# Patient Record
Sex: FEMALE | Race: WHITE
[De-identification: names, ages, dates, MRNs, and addresses within clinical notes are randomized per-mention and may not be internally consistent; named-entity substitution may affect disease eponyms.]

---

## 2020-04-30 ENCOUNTER — HOSPITAL ENCOUNTER (OUTPATIENT)
Dept: HOSPITAL 92 - BICRAD | Age: 80
Discharge: HOME | End: 2020-04-30
Attending: ORTHOPAEDIC SURGERY
Payer: MEDICARE

## 2020-04-30 DIAGNOSIS — Z98.1: ICD-10-CM

## 2020-04-30 DIAGNOSIS — M54.5: Primary | ICD-10-CM

## 2020-04-30 DIAGNOSIS — M43.8X6: ICD-10-CM

## 2020-04-30 PROCEDURE — 72100 X-RAY EXAM L-S SPINE 2/3 VWS: CPT

## 2020-04-30 NOTE — RAD
TWO VIEWS LUMBOSACRAL SPINE:

 

COMPARISON: 

2/13/2020.

 

HISTORY: 

Low back pain.  Lumbar fusion in March of 2020.

 

FINDINGS: 

Two views of the lumbosacral spine show the patient to be status post fusion of L5 and S1 with bilate
ral pedicle screws.  A disk spacer is seen in the intervening disk space.  There is stable wedge comp
ression deformity of the L1 vertebral body with approximately 60% height loss.  There is no evidence 
of subluxation.  There is stable sclerotic curvature of the spine.  There is a questionable extralate
ral mass at L3 which may represent a congenital anomaly. No perihardware lucency is seen surrounding 
the fracture. 

 

IMPRESSION: 

1.  Status post fusion of L5-S1 without evidence of complication.

 

2.  Stable wedge compression deformity of L1.

 

POS: EAA

## 2022-10-19 ENCOUNTER — HOSPITAL ENCOUNTER (EMERGENCY)
Dept: HOSPITAL 92 - CSHERS | Age: 82
Discharge: HOME | End: 2022-10-19
Payer: MEDICARE

## 2022-10-19 DIAGNOSIS — I10: ICD-10-CM

## 2022-10-19 DIAGNOSIS — M25.521: ICD-10-CM
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
## 2022-10-19 DIAGNOSIS — E78.00: ICD-10-CM

## 2022-10-19 DIAGNOSIS — M25.561: Primary | ICD-10-CM

## 2022-10-19 PROCEDURE — 70450 CT HEAD/BRAIN W/O DYE: CPT

## 2022-10-19 PROCEDURE — 90471 IMMUNIZATION ADMIN: CPT

## 2022-10-19 PROCEDURE — 90715 TDAP VACCINE 7 YRS/> IM: CPT

## 2022-10-19 PROCEDURE — 96374 THER/PROPH/DIAG INJ IV PUSH: CPT

## 2024-06-28 ENCOUNTER — HOSPITAL ENCOUNTER (INPATIENT)
Dept: HOSPITAL 92 - CSHERS | Age: 84
LOS: 3 days | Discharge: HOME | DRG: 641 | End: 2024-07-01
Attending: INTERNAL MEDICINE | Admitting: INTERNAL MEDICINE
Payer: MEDICARE

## 2024-06-28 VITALS — BODY MASS INDEX: 25.8 KG/M2

## 2024-06-28 DIAGNOSIS — F32.A: ICD-10-CM

## 2024-06-28 DIAGNOSIS — I10: ICD-10-CM

## 2024-06-28 DIAGNOSIS — E87.1: Primary | ICD-10-CM

## 2024-06-28 DIAGNOSIS — R53.81: ICD-10-CM

## 2024-06-28 DIAGNOSIS — Z90.710: ICD-10-CM

## 2024-06-28 DIAGNOSIS — E78.5: ICD-10-CM

## 2024-06-28 DIAGNOSIS — N19: ICD-10-CM

## 2024-06-28 DIAGNOSIS — Z79.899: ICD-10-CM

## 2024-06-28 DIAGNOSIS — K59.00: ICD-10-CM

## 2024-06-28 DIAGNOSIS — E03.9: ICD-10-CM

## 2024-06-28 DIAGNOSIS — E86.0: ICD-10-CM

## 2024-06-28 DIAGNOSIS — E87.5: ICD-10-CM

## 2024-06-28 DIAGNOSIS — Z90.49: ICD-10-CM

## 2024-06-28 DIAGNOSIS — Z98.890: ICD-10-CM

## 2024-06-28 LAB
ALBUMIN SERPL BCG-MCNC: 3.3 G/DL (ref 3.4–4.8)
ALP SERPL-CCNC: 51 U/L (ref 40–110)
ALT SERPL W P-5'-P-CCNC: 9 U/L (ref 8–55)
ANION GAP SERPL CALC-SCNC: 11 MMOL/L (ref 10–20)
AST SERPL-CCNC: 15 U/L (ref 5–34)
BACTERIA UR QL AUTO: (no result) HPF
BASOPHILS # BLD AUTO: 0.05 10X3/UL (ref 0–0.2)
BASOPHILS NFR BLD AUTO: 0.6 % (ref 0–2)
BILIRUB SERPL-MCNC: 0.3 MG/DL (ref 0.2–1.2)
BUN SERPL-MCNC: 22 MG/DL (ref 9.8–20.1)
CALCIUM SERPL-MCNC: 8.4 MG/DL (ref 7.8–10.44)
CAUTI INDICATIONS FOR CULTURE: (no result)
CHLORIDE SERPL-SCNC: 98 MMOL/L (ref 98–107)
CK SERPL-CCNC: 33 U/L (ref 29–168)
CO2 SERPL-SCNC: 23 MMOL/L (ref 23–31)
CREAT CL PREDICTED SERPL C-G-VRATE: 0 ML/MIN (ref 70–130)
EOSINOPHIL # BLD AUTO: 0.17 10X3/UL (ref 0–0.5)
EOSINOPHIL NFR BLD AUTO: 1.9 % (ref 0–6)
GLOBULIN SER CALC-MCNC: 2.7 G/DL (ref 2.4–3.5)
GLUCOSE SERPL-MCNC: 98 MG/DL (ref 83–110)
HCT VFR BLD CALC: 34.4 % (ref 34.9–44.5)
HGB BLD-MCNC: 11.8 G/DL (ref 12–15.5)
LYMPHOCYTES NFR BLD AUTO: 14.7 % (ref 18–47)
MCH RBC QN AUTO: 29.1 PG (ref 27–33)
MCV RBC AUTO: 84.7 FL (ref 81.6–98.3)
MONOCYTES # BLD AUTO: 1.06 10X3/UL (ref 0–1.1)
MONOCYTES NFR BLD AUTO: 11.7 % (ref 0–10)
NEUTROPHILS # BLD AUTO: 6.41 10X3/UL (ref 1.5–8.4)
NEUTROPHILS NFR BLD AUTO: 70.7 % (ref 40–75)
PLATELET # BLD AUTO: 260 10X3/UL (ref 150–450)
POTASSIUM SERPL-SCNC: 4.1 MMOL/L (ref 3.5–5.1)
RBC # BLD AUTO: 4.06 10X6/UL (ref 3.9–5.03)
RBC UR QL AUTO: (no result) HPF (ref 0–3)
SODIUM SERPL-SCNC: 128 MMOL/L (ref 136–145)
SP GR UR STRIP: 1.01 (ref 1–1.03)
TROPONIN I SERPL DL<=0.01 NG/ML-MCNC: (no result) NG/ML (ref ?–0.03)
WBC # BLD AUTO: 9.1 10X3/UL (ref 3.5–10.5)
WBC UR QL AUTO: (no result) HPF (ref 0–3)

## 2024-06-28 PROCEDURE — 84443 ASSAY THYROID STIM HORMONE: CPT

## 2024-06-28 PROCEDURE — 84300 ASSAY OF URINE SODIUM: CPT

## 2024-06-28 PROCEDURE — 87086 URINE CULTURE/COLONY COUNT: CPT

## 2024-06-28 PROCEDURE — 96374 THER/PROPH/DIAG INJ IV PUSH: CPT

## 2024-06-28 PROCEDURE — 80048 BASIC METABOLIC PNL TOTAL CA: CPT

## 2024-06-28 PROCEDURE — 93005 ELECTROCARDIOGRAM TRACING: CPT

## 2024-06-28 PROCEDURE — 83880 ASSAY OF NATRIURETIC PEPTIDE: CPT

## 2024-06-28 PROCEDURE — 83735 ASSAY OF MAGNESIUM: CPT

## 2024-06-28 PROCEDURE — 83935 ASSAY OF URINE OSMOLALITY: CPT

## 2024-06-28 PROCEDURE — 84133 ASSAY OF URINE POTASSIUM: CPT

## 2024-06-28 PROCEDURE — 84484 ASSAY OF TROPONIN QUANT: CPT

## 2024-06-28 PROCEDURE — 70450 CT HEAD/BRAIN W/O DYE: CPT

## 2024-06-28 PROCEDURE — 36415 COLL VENOUS BLD VENIPUNCTURE: CPT

## 2024-06-28 PROCEDURE — 82550 ASSAY OF CK (CPK): CPT

## 2024-06-28 PROCEDURE — 74177 CT ABD & PELVIS W/CONTRAST: CPT

## 2024-06-28 PROCEDURE — 51701 INSERT BLADDER CATHETER: CPT

## 2024-06-28 PROCEDURE — 81001 URINALYSIS AUTO W/SCOPE: CPT

## 2024-06-28 PROCEDURE — 82436 ASSAY OF URINE CHLORIDE: CPT

## 2024-06-28 PROCEDURE — 80053 COMPREHEN METABOLIC PANEL: CPT

## 2024-06-28 PROCEDURE — 83930 ASSAY OF BLOOD OSMOLALITY: CPT

## 2024-06-28 PROCEDURE — 71045 X-RAY EXAM CHEST 1 VIEW: CPT

## 2024-06-28 PROCEDURE — 85025 COMPLETE CBC W/AUTO DIFF WBC: CPT

## 2024-06-28 RX ADMIN — DOCUSATE SODIUM 50 MG AND SENNOSIDES 8.6 MG SCH TAB: 8.6; 5 TABLET, FILM COATED ORAL at 22:35

## 2024-06-28 RX ADMIN — ONDANSETRON PRN MG: 2 INJECTION INTRAMUSCULAR; INTRAVENOUS at 22:44

## 2024-06-29 LAB
ANION GAP SERPL CALC-SCNC: 17 MMOL/L (ref 10–20)
ANION GAP SERPL CALC-SCNC: 17 MMOL/L (ref 10–20)
BUN SERPL-MCNC: 14 MG/DL (ref 9.8–20.1)
BUN SERPL-MCNC: 14 MG/DL (ref 9.8–20.1)
CALCIUM SERPL-MCNC: 8.8 MG/DL (ref 7.8–10.44)
CALCIUM SERPL-MCNC: 9.3 MG/DL (ref 7.8–10.44)
CHLORIDE SERPL-SCNC: 100 MMOL/L (ref 98–107)
CHLORIDE SERPL-SCNC: 98 MMOL/L (ref 98–107)
CO2 SERPL-SCNC: 21 MMOL/L (ref 23–31)
CO2 SERPL-SCNC: 21 MMOL/L (ref 23–31)
CREAT CL PREDICTED SERPL C-G-VRATE: 51 ML/MIN (ref 70–130)
CREAT CL PREDICTED SERPL C-G-VRATE: 52 ML/MIN (ref 70–130)
GLUCOSE SERPL-MCNC: 104 MG/DL (ref 83–110)
GLUCOSE SERPL-MCNC: 140 MG/DL (ref 83–110)
MAGNESIUM SERPL-MCNC: 1.9 MG/DL (ref 1.6–2.6)
POTASSIUM SERPL-SCNC: 4.8 MMOL/L (ref 3.5–5.1)
POTASSIUM SERPL-SCNC: 5.2 MMOL/L (ref 3.5–5.1)
POTASSIUM UR-SCNC: 25.7 MMOL/L
SODIUM SERPL-SCNC: 131 MMOL/L (ref 136–145)
SODIUM SERPL-SCNC: 133 MMOL/L (ref 136–145)
SODIUM UR-SCNC: 135 MMOL/L

## 2024-06-29 RX ADMIN — BUPROPION HYDROCHLORIDE SCH MG: 150 TABLET, FILM COATED, EXTENDED RELEASE ORAL at 10:33

## 2024-06-29 RX ADMIN — ENOXAPARIN SODIUM SCH MG: 100 INJECTION SUBCUTANEOUS at 21:10

## 2024-06-30 LAB
ALBUMIN SERPL BCG-MCNC: 2.8 G/DL (ref 3.4–4.8)
ALP SERPL-CCNC: 42 U/L (ref 40–110)
ALT SERPL W P-5'-P-CCNC: 7 U/L (ref 8–55)
ANION GAP SERPL CALC-SCNC: 10 MMOL/L (ref 10–20)
ANION GAP SERPL CALC-SCNC: 12 MMOL/L (ref 10–20)
AST SERPL-CCNC: 13 U/L (ref 5–34)
BASOPHILS # BLD AUTO: 0.04 10X3/UL (ref 0–0.2)
BASOPHILS NFR BLD AUTO: 0.4 % (ref 0–2)
BILIRUB SERPL-MCNC: 0.3 MG/DL (ref 0.2–1.2)
BUN SERPL-MCNC: 11 MG/DL (ref 9.8–20.1)
BUN SERPL-MCNC: 13 MG/DL (ref 9.8–20.1)
CALCIUM SERPL-MCNC: 8.3 MG/DL (ref 7.8–10.44)
CALCIUM SERPL-MCNC: 8.5 MG/DL (ref 7.8–10.44)
CHLORIDE SERPL-SCNC: 102 MMOL/L (ref 98–107)
CHLORIDE SERPL-SCNC: 105 MMOL/L (ref 98–107)
CO2 SERPL-SCNC: 19 MMOL/L (ref 23–31)
CO2 SERPL-SCNC: 25 MMOL/L (ref 23–31)
CREAT CL PREDICTED SERPL C-G-VRATE: 57 ML/MIN (ref 70–130)
CREAT CL PREDICTED SERPL C-G-VRATE: 61 ML/MIN (ref 70–130)
EOSINOPHIL # BLD AUTO: 0.23 10X3/UL (ref 0–0.5)
EOSINOPHIL NFR BLD AUTO: 2.4 % (ref 0–6)
GLOBULIN SER CALC-MCNC: 2.3 G/DL (ref 2.4–3.5)
GLUCOSE SERPL-MCNC: 104 MG/DL (ref 83–110)
GLUCOSE SERPL-MCNC: 96 MG/DL (ref 83–110)
HCT VFR BLD CALC: 34 % (ref 34.9–44.5)
HGB BLD-MCNC: 11 G/DL (ref 12–15.5)
LYMPHOCYTES NFR BLD AUTO: 12.9 % (ref 18–47)
MAGNESIUM SERPL-MCNC: 1.8 MG/DL (ref 1.6–2.6)
MCH RBC QN AUTO: 28.4 PG (ref 27–33)
MCV RBC AUTO: 87.9 FL (ref 81.6–98.3)
MONOCYTES # BLD AUTO: 0.99 10X3/UL (ref 0–1.1)
MONOCYTES NFR BLD AUTO: 10.4 % (ref 0–10)
NEUTROPHILS # BLD AUTO: 6.99 10X3/UL (ref 1.5–8.4)
NEUTROPHILS NFR BLD AUTO: 73.6 % (ref 40–75)
PLATELET # BLD AUTO: 219 10X3/UL (ref 150–450)
POTASSIUM SERPL-SCNC: 4.3 MMOL/L (ref 3.5–5.1)
POTASSIUM SERPL-SCNC: 4.4 MMOL/L (ref 3.5–5.1)
RBC # BLD AUTO: 3.87 10X6/UL (ref 3.9–5.03)
SODIUM SERPL-SCNC: 132 MMOL/L (ref 136–145)
SODIUM SERPL-SCNC: 133 MMOL/L (ref 136–145)
WBC # BLD AUTO: 9.5 10X3/UL (ref 3.5–10.5)

## 2024-06-30 RX ADMIN — SODIUM BICARBONATE SCH MG: 325 TABLET ORAL at 09:42

## 2024-07-01 VITALS — DIASTOLIC BLOOD PRESSURE: 70 MMHG | SYSTOLIC BLOOD PRESSURE: 129 MMHG

## 2024-07-01 VITALS — TEMPERATURE: 97.8 F
